# Patient Record
Sex: FEMALE | Race: WHITE | NOT HISPANIC OR LATINO | Employment: FULL TIME | URBAN - METROPOLITAN AREA
[De-identification: names, ages, dates, MRNs, and addresses within clinical notes are randomized per-mention and may not be internally consistent; named-entity substitution may affect disease eponyms.]

---

## 2019-05-17 PROBLEM — H60.313 CHRONIC DIFFUSE OTITIS EXTERNA OF BOTH EARS: Status: ACTIVE | Noted: 2018-06-25

## 2019-05-17 PROBLEM — J45.20 INTERMITTENT ASTHMA, UNCOMPLICATED: Status: ACTIVE | Noted: 2017-11-15

## 2019-05-17 PROBLEM — L29.9 PRURITUS: Status: ACTIVE | Noted: 2017-11-15

## 2019-05-17 PROBLEM — F41.9 ANXIETY: Status: ACTIVE | Noted: 2017-01-12

## 2019-05-17 PROBLEM — H66.93 RECURRENT OTITIS MEDIA OF BOTH EARS: Status: ACTIVE | Noted: 2017-12-18

## 2019-05-17 PROBLEM — Z87.01 HISTORY OF PNEUMONIA: Status: ACTIVE | Noted: 2018-01-05

## 2019-05-17 PROBLEM — R76.8 LOW SERUM IGM FOR AGE: Status: ACTIVE | Noted: 2017-12-18

## 2019-05-17 PROBLEM — L20.9 AD (ATOPIC DERMATITIS): Status: ACTIVE | Noted: 2017-11-15

## 2019-05-17 PROBLEM — J32.9 RECURRENT SINUS INFECTIONS: Status: ACTIVE | Noted: 2017-11-21

## 2019-05-17 PROBLEM — Z91.09 HOUSE DUST MITE ALLERGY: Status: ACTIVE | Noted: 2018-08-03

## 2019-05-17 PROBLEM — D72.19 PERIPHERAL EOSINOPHILIA: Status: ACTIVE | Noted: 2018-06-06

## 2019-05-17 PROBLEM — Z87.2 H/O ABSCESS OF SKIN AND SUBCUTANEOUS TISSUE: Status: ACTIVE | Noted: 2018-01-10

## 2019-05-17 PROBLEM — R76.8 ELEVATED IGE LEVEL: Status: ACTIVE | Noted: 2017-12-18

## 2019-10-21 PROBLEM — E66.01 CLASS 3 SEVERE OBESITY IN ADULT: Chronic | Status: ACTIVE | Noted: 2019-10-21

## 2019-10-21 PROBLEM — F33.1 MDD (MAJOR DEPRESSIVE DISORDER), RECURRENT EPISODE, MODERATE: Chronic | Status: ACTIVE | Noted: 2019-10-21

## 2019-11-05 PROBLEM — R03.0 ELEVATED BLOOD PRESSURE READING WITHOUT DIAGNOSIS OF HYPERTENSION: Status: ACTIVE | Noted: 2019-11-05

## 2019-12-02 PROBLEM — L29.9 PRURITUS: Status: RESOLVED | Noted: 2017-11-15 | Resolved: 2019-12-02

## 2020-02-14 PROBLEM — J30.9 ALLERGIC RHINOCONJUNCTIVITIS: Status: ACTIVE | Noted: 2020-02-14

## 2020-02-14 PROBLEM — H10.10 ALLERGIC RHINOCONJUNCTIVITIS: Status: ACTIVE | Noted: 2020-02-14

## 2020-02-14 PROBLEM — J30.89 ALLERGIC RHINITIS DUE TO DUST MITE: Status: ACTIVE | Noted: 2020-02-14

## 2020-05-14 PROBLEM — F41.9 ANXIETY: Status: RESOLVED | Noted: 2017-01-12 | Resolved: 2020-05-14

## 2020-06-12 PROBLEM — H62.41 OTITIS EXTERNA, FUNGAL, RIGHT EAR: Status: ACTIVE | Noted: 2020-06-12

## 2020-06-12 PROBLEM — B36.9 OTITIS EXTERNA, FUNGAL, RIGHT EAR: Status: ACTIVE | Noted: 2020-06-12

## 2020-06-12 PROBLEM — H60.543 ECZEMA OF EXTERNAL EAR, BILATERAL: Status: ACTIVE | Noted: 2020-06-12

## 2020-09-09 ENCOUNTER — TELEPHONE (OUTPATIENT)
Dept: PHARMACY | Facility: CLINIC | Age: 35
End: 2020-09-09

## 2020-09-23 NOTE — TELEPHONE ENCOUNTER
DOCUMENTATION ONLY:  Prior authorization for Dupixent approved from 09/19/20 to 09/22/21    Case Id: [id number]    Co-pay: $0

## 2020-10-25 DIAGNOSIS — U07.1 COVID-19 VIRUS DETECTED: ICD-10-CM

## 2021-01-12 ENCOUNTER — TELEPHONE (OUTPATIENT)
Dept: PHARMACY | Facility: CLINIC | Age: 36
End: 2021-01-12

## 2021-01-15 ENCOUNTER — SPECIALTY PHARMACY (OUTPATIENT)
Dept: PHARMACY | Facility: CLINIC | Age: 36
End: 2021-01-15

## 2021-01-15 DIAGNOSIS — L20.9 ATOPIC DERMATITIS, UNSPECIFIED TYPE: Primary | ICD-10-CM

## 2021-01-20 PROBLEM — F43.10 PTSD (POST-TRAUMATIC STRESS DISORDER): Status: ACTIVE | Noted: 2021-01-20

## 2021-02-08 ENCOUNTER — SPECIALTY PHARMACY (OUTPATIENT)
Dept: PHARMACY | Facility: CLINIC | Age: 36
End: 2021-02-08

## 2021-03-08 ENCOUNTER — SPECIALTY PHARMACY (OUTPATIENT)
Dept: PHARMACY | Facility: CLINIC | Age: 36
End: 2021-03-08

## 2021-04-01 ENCOUNTER — SPECIALTY PHARMACY (OUTPATIENT)
Dept: PHARMACY | Facility: CLINIC | Age: 36
End: 2021-04-01

## 2021-04-29 ENCOUNTER — PATIENT MESSAGE (OUTPATIENT)
Dept: PHARMACY | Facility: CLINIC | Age: 36
End: 2021-04-29

## 2021-05-05 ENCOUNTER — SPECIALTY PHARMACY (OUTPATIENT)
Dept: PHARMACY | Facility: CLINIC | Age: 36
End: 2021-05-05

## 2021-05-12 ENCOUNTER — PATIENT MESSAGE (OUTPATIENT)
Dept: RESEARCH | Facility: HOSPITAL | Age: 36
End: 2021-05-12

## 2021-06-04 ENCOUNTER — PATIENT MESSAGE (OUTPATIENT)
Dept: PHARMACY | Facility: CLINIC | Age: 36
End: 2021-06-04

## 2021-06-07 ENCOUNTER — SPECIALTY PHARMACY (OUTPATIENT)
Dept: PHARMACY | Facility: CLINIC | Age: 36
End: 2021-06-07

## 2021-06-30 ENCOUNTER — PATIENT MESSAGE (OUTPATIENT)
Dept: PHARMACY | Facility: CLINIC | Age: 36
End: 2021-06-30

## 2021-07-05 PROBLEM — F43.10 PTSD (POST-TRAUMATIC STRESS DISORDER): Chronic | Status: ACTIVE | Noted: 2021-01-20

## 2021-07-05 PROBLEM — H60.313 CHRONIC DIFFUSE OTITIS EXTERNA OF BOTH EARS: Chronic | Status: ACTIVE | Noted: 2018-06-25

## 2021-07-05 PROBLEM — J30.89 ALLERGIC RHINITIS DUE TO DUST MITE: Chronic | Status: ACTIVE | Noted: 2020-02-14

## 2021-07-05 PROBLEM — H66.93 RECURRENT OTITIS MEDIA OF BOTH EARS: Chronic | Status: ACTIVE | Noted: 2017-12-18

## 2021-07-05 PROBLEM — J32.9 RECURRENT SINUS INFECTIONS: Chronic | Status: ACTIVE | Noted: 2017-11-21

## 2021-07-05 PROBLEM — Z87.01 HISTORY OF PNEUMONIA: Status: RESOLVED | Noted: 2018-01-05 | Resolved: 2021-07-05

## 2021-07-05 PROBLEM — J45.20 MILD INTERMITTENT ASTHMA WITHOUT COMPLICATION: Chronic | Status: ACTIVE | Noted: 2017-11-15

## 2021-07-05 PROBLEM — H60.543 ECZEMA OF EXTERNAL EAR, BILATERAL: Chronic | Status: ACTIVE | Noted: 2020-06-12

## 2021-07-05 PROBLEM — F41.9 ANXIETY DISORDER, UNSPECIFIED: Chronic | Status: ACTIVE | Noted: 2017-01-12

## 2021-07-05 PROBLEM — R03.0 ELEVATED BLOOD PRESSURE READING WITHOUT DIAGNOSIS OF HYPERTENSION: Status: RESOLVED | Noted: 2019-11-05 | Resolved: 2021-07-05

## 2021-07-05 PROBLEM — Z91.09 HOUSE DUST MITE ALLERGY: Chronic | Status: ACTIVE | Noted: 2018-08-03

## 2021-07-06 ENCOUNTER — SPECIALTY PHARMACY (OUTPATIENT)
Dept: PHARMACY | Facility: CLINIC | Age: 36
End: 2021-07-06

## 2021-07-08 PROBLEM — I10 ESSENTIAL HYPERTENSION: Chronic | Status: ACTIVE | Noted: 2021-07-08

## 2021-07-08 PROBLEM — F43.23 ADJUSTMENT DISORDER WITH MIXED ANXIETY AND DEPRESSED MOOD: Chronic | Status: ACTIVE | Noted: 2021-07-08

## 2021-07-30 ENCOUNTER — SPECIALTY PHARMACY (OUTPATIENT)
Dept: PHARMACY | Facility: CLINIC | Age: 36
End: 2021-07-30

## 2021-09-05 ENCOUNTER — SPECIALTY PHARMACY (OUTPATIENT)
Dept: PHARMACY | Facility: CLINIC | Age: 36
End: 2021-09-05

## 2021-09-28 ENCOUNTER — SPECIALTY PHARMACY (OUTPATIENT)
Dept: PHARMACY | Facility: CLINIC | Age: 36
End: 2021-09-28

## 2021-09-29 ENCOUNTER — SPECIALTY PHARMACY (OUTPATIENT)
Dept: PHARMACY | Facility: CLINIC | Age: 36
End: 2021-09-29

## 2021-10-22 ENCOUNTER — SPECIALTY PHARMACY (OUTPATIENT)
Dept: PHARMACY | Facility: CLINIC | Age: 36
End: 2021-10-22

## 2021-11-26 ENCOUNTER — SPECIALTY PHARMACY (OUTPATIENT)
Dept: PHARMACY | Facility: CLINIC | Age: 36
End: 2021-11-26

## 2021-12-28 ENCOUNTER — PATIENT MESSAGE (OUTPATIENT)
Dept: PHARMACY | Facility: CLINIC | Age: 36
End: 2021-12-28

## 2022-01-04 ENCOUNTER — SPECIALTY PHARMACY (OUTPATIENT)
Dept: PHARMACY | Facility: CLINIC | Age: 37
End: 2022-01-04

## 2022-01-05 NOTE — TELEPHONE ENCOUNTER
Incoming call from pt regarding dupixent refill, pt due to inject 1/7. Informed pt refill request has been sent and will f/u to set up shipment once received, pt verbalized understanding. Pt also sending request to MD through [employer].

## 2022-01-12 NOTE — TELEPHONE ENCOUNTER
Called [facility] for Dr. Guy regarding Dupixent refill (P: [phone]) and was informed Dr. Guy is no longer there. Office advised to call back after 1pm today to reach nurse's station regarding refill.

## 2022-01-12 NOTE — TELEPHONE ENCOUNTER
Called Allergy for Dr. Guy regarding Dupixent refill (P: [phone]) - [facility]. [facility] will attempt again, office likely closed.

## 2022-01-14 NOTE — TELEPHONE ENCOUNTER
Called Allergy for Dr. Guy regarding Dupixent refill (P: [phone]) - [facility].     Called patient regarding Dupixent refill to inform [facility] is still trying to reach provider for refills, and no refills remaining on file. Patient stated she was due to inject Friday 1/7. [facility] will follow up with patient as soon as refills can be obtained.

## 2022-01-18 NOTE — TELEPHONE ENCOUNTER
Called Allergy for Dr. Guy regarding Dupixent refill (P: [phone]). Staff informed clinic is 1-2pm and that is the best time to call back. Staff attempted to transfer to fellows office - LVM with fellows office to request refill.

## 2022-01-20 NOTE — TELEPHONE ENCOUNTER
Called Allergy for Dr. Guy to request Dupixent refill (P: [phone]). Staff transferred call to nurse Corinna. Corinna stated that Dr. Guy is no longer there and patient would have to see new provider. She stated office would reach out to patient regarding Allergy provider and would send Dupixent refill once authorized. She said patient may need to be seen first. Corinna said she would call patient today.

## 2022-02-02 NOTE — TELEPHONE ENCOUNTER
Called patient regarding Dupixent to review if she has been able to establish care with new provider. She stated she was told she would need another appt and is reluctant. Advised patient to call clinic and see if she is able to get refills prior to being seen since this is a continuation. Informed [facility] will touch base when a new order is sent. She voiced understanding and had no further questions or concerns.     Closing patient enrollment with [facility] and will reopen if patient has new order sent.

## 2022-03-08 PROBLEM — H62.41 OTITIS EXTERNA, FUNGAL, RIGHT EAR: Status: RESOLVED | Noted: 2020-06-12 | Resolved: 2022-03-08

## 2022-03-08 PROBLEM — R76.8 LOW SERUM IGM FOR AGE: Status: RESOLVED | Noted: 2017-12-18 | Resolved: 2022-03-08

## 2022-03-08 PROBLEM — H10.10 ALLERGIC RHINOCONJUNCTIVITIS: Status: RESOLVED | Noted: 2020-02-14 | Resolved: 2022-03-08

## 2022-03-08 PROBLEM — J30.9 ALLERGIC RHINOCONJUNCTIVITIS: Status: RESOLVED | Noted: 2020-02-14 | Resolved: 2022-03-08

## 2022-03-08 PROBLEM — D72.19 PERIPHERAL EOSINOPHILIA: Status: RESOLVED | Noted: 2018-06-06 | Resolved: 2022-03-08

## 2022-03-08 PROBLEM — L20.9 AD (ATOPIC DERMATITIS): Status: RESOLVED | Noted: 2017-11-15 | Resolved: 2022-03-08

## 2022-03-08 PROBLEM — Z87.2 H/O ABSCESS OF SKIN AND SUBCUTANEOUS TISSUE: Status: RESOLVED | Noted: 2018-01-10 | Resolved: 2022-03-08

## 2022-03-08 PROBLEM — B36.9 OTITIS EXTERNA, FUNGAL, RIGHT EAR: Status: RESOLVED | Noted: 2020-06-12 | Resolved: 2022-03-08

## 2022-03-08 PROBLEM — R76.8 ELEVATED IGE LEVEL: Status: RESOLVED | Noted: 2017-12-18 | Resolved: 2022-03-08

## 2022-03-13 PROBLEM — M25.561 CHRONIC PAIN OF RIGHT KNEE: Chronic | Status: ACTIVE | Noted: 2022-03-13

## 2022-03-13 PROBLEM — G89.29 CHRONIC PAIN OF RIGHT KNEE: Chronic | Status: ACTIVE | Noted: 2022-03-13

## 2022-03-13 PROBLEM — G56.03 BILATERAL CARPAL TUNNEL SYNDROME: Chronic | Status: ACTIVE | Noted: 2022-03-13

## 2022-09-20 ENCOUNTER — SPECIALTY PHARMACY (OUTPATIENT)
Dept: PHARMACY | Facility: CLINIC | Age: 37
End: 2022-09-20

## 2022-09-20 NOTE — TELEPHONE ENCOUNTER
Avinash, this is Amita Olivarez with [facility].  We are working on your prescription that your doctor has sent us. We will be working with your insurance to get this approved for you. We will be calling you along the way with updates on your medication.  If you have any questions, you can reach us at [phone].    Welcome call outcome: Patient/caregiver reached

## 2022-09-20 NOTE — TELEPHONE ENCOUNTER
Faxed PA to [facility], will follow up until a determination is made.

## 2022-09-22 NOTE — TELEPHONE ENCOUNTER
Faxed [phone] for a redetermination, prescription was in consultation with a dermatologist. Will follow up.

## 2022-09-23 NOTE — TELEPHONE ENCOUNTER
PA tracking ID [id number] approved 09/22/22 to 03/22/23, Dupixent 300/2ml.  Test claim pays for 0.00 for loading and maintenance.    BI/FA not needed, sending patient to initial.

## 2022-09-26 ENCOUNTER — SPECIALTY PHARMACY (OUTPATIENT)
Dept: PHARMACY | Facility: CLINIC | Age: 37
End: 2022-09-26

## 2022-09-26 DIAGNOSIS — L20.9 ATOPIC DERMATITIS, UNSPECIFIED TYPE: Primary | ICD-10-CM

## 2022-09-26 NOTE — TELEPHONE ENCOUNTER
Specialty Pharmacy - Initial Clinical Assessment    Specialty Medication Orders Linked to Encounter      Flowsheet Row Most Recent Value   Medication #1 dupilumab (DUPIXENT SYRINGE) 300 mg/2 mL Syrg (Order#[id number], Rx#[id number])   Medication #2 dupilumab (DUPIXENT SYRINGE) 300 mg/2 mL Syrg (Order#[id number], Rx#[id number])          Patient Diagnosis   L20.9 - Atopic dermatitis, unspecified type    Subjective    Maira Quintana is a 37 y.o. female, who is followed by the specialty pharmacy service for management and education.    Recent Encounters       Date Type Provider Description    09/26/2022 Specialty Pharmacy Amita Olivarez PharmD Initial Clinical Assessment    09/20/2022 Specialty Pharmacy Amita Olivarez PharmD Referral Authorization    01/04/2022 Specialty Pharmacy Maira Reeves PharmD Refill Coordination; Clinical Intervention    11/26/2021 Specialty Pharmacy Dione Padua Esguerra Refill Coordination    10/22/2021 Specialty Pharmacy Estella Almendarez Refill Coordination          Clinical call attempts since last clinical assessment   1/14/2022  3:22 PM - Specialty Pharmacy - Clinical Intervention by Maira Reeves PharmD  1/20/2022  7:29 PM - Specialty Pharmacy - Clinical Intervention by aMira Reeves PharmD  1/24/2022  8:36 PM - Specialty Pharmacy - Clinical Intervention by Maira Reeves PharmD     Current Outpatient Medications   Medication Sig    albuterol (VENTOLIN HFA) 90 mcg/actuation inhaler Inhale 2 puffs into the lungs every 4 (four) hours as needed for Wheezing or Shortness of Breath (or cough related to asthma).    ALPRAZolam (XANAX) 0.5 MG tablet Take 1 tablet (0.5 mg total) by mouth nightly as needed for Anxiety.    dupilumab (DUPIXENT SYRINGE) 300 mg/2 mL Syrg Inject 4 mLs (600 mg total) into the skin once. for 1 dose    [START ON 9/29/2022] dupilumab (DUPIXENT SYRINGE) 300 mg/2 mL Syrg Inject 2 mLs (300 mg total) into the skin every 14 (fourteen) days.    fluticasone  propionate (FLONASE) 50 mcg/actuation nasal spray 1 spray (50 mcg total) by Each Nostril route once daily.    traZODone (DESYREL) 100 MG tablet Take 1 tablet (100 mg total) by mouth nightly as needed for Insomnia.    tretinoin (RETIN-A) 0.025 % cream Apply topically every evening.    triamcinolone acetonide 0.1% (KENALOG) 0.1 % ointment Apply to eczema flares on arms, legs, and trunk twice daily as needed.    venlafaxine (EFFEXOR-XR) 150 MG Cp24 Take 1 capsule (150 mg total) by mouth every morning.    etonogestreL (NEXPLANON) 68 mg Impl subdermal device 68 mg by Subdermal route once. A single NEXPLANON implant is inserted subdermally just under the skin at the inner side of the non-dominant upper arm.    neomycin-polymyxin-dexamethasone (MAXITROL) 3.5mg/mL-10,000 unit/mL-0.1 % DrpS     omeprazole (PRILOSEC) 40 MG capsule     ondansetron (ZOFRAN-ODT) 4 MG TbDL Take 4 mg by mouth every 6 (six) hours as needed.   Last reviewed on 9/26/2022  2:03 PM by Amita Olivarez, PharmD    Review of patient's allergies indicates:  No Known AllergiesLast reviewed on  9/26/2022 2:03 PM by Amita Olivarez    Drug Interactions    Drug interactions evaluated: yes  Clinically relevant drug interactions identified: no  Provided the patient with educational material regarding drug interactions: not applicable         Adverse Effects    Pruritus: Pos       Assessment Questions - Documented Responses      Flowsheet Row Most Recent Value   Assessment    Medication Reconciliation completed for patient Yes   During the past 4 weeks, has patient missed any activities due to condition or medication? Missed Work, Missed Planned Activities   During the past 4 weeks, did patient have any of the following urgent care visits? None   Goals of Therapy Status Discussed (new start)   Status of the patients ability to self-administer: Is Able   All education points have been covered with patient? No, patient declined- printed education provided   Welcome packet  "contents reviewed and discussed with patient? Yes   Assesment completed? Yes   Plan Therapy being initiated   Do you need to open a clinical intervention (i-vent)? No   Do you want to schedule first shipment? Yes          Refill Questions - Documented Responses      Flowsheet Row Most Recent Value   Patient Availability and HIPAA Verification    Does patient want to proceed with activity? Yes   HIPAA/medical authority confirmed? Yes   Relationship to patient of person spoken to? Self   Refill Screening Questions    When does the patient need to receive the medication? 09/28/22   Refill Delivery Questions    How will the patient receive the medication? Mail   When does the patient need to receive the medication? 09/28/22   Shipping Address Home   Address in [facility] confirmed and updated if neccessary? Yes   Expected Copay ($) 0   Is the patient able to afford the medication copay? Yes   Payment Method zero copay   Days supply of Refill 14   Supplies needed? No supplies needed   Refill activity completed? Yes   Refill activity plan Refill scheduled   Shipment/Pickup Date: 09/27/22            Objective    She has a past medical history of Abnormal Pap smear of cervix, Abscess of skin of abdomen (2016), Admitted with pneumonia (1992), Allergy, Anxiety (01/12/2017), Asthma, Depression, Eczema (1995), H/O gastric sleeve, Hypertension, and PTSD (post-traumatic stress disorder).    Tried/failed medications: eucrisa/ elidel/ topical steroids. Patient previously on Dupixent therapy, patient restarting Dupixent.    BP Readings from Last 4 Encounters:   09/15/22 136/79   08/02/22 117/67   03/31/22 128/80   03/18/22 (!) 150/70     Ht Readings from Last 4 Encounters:   09/15/22 5' 2" (1.575 m)   08/02/22 5' 2" (1.575 m)   03/31/22 5' 2" (1.575 m)   03/18/22 5' 2" (1.575 m)     Wt Readings from Last 4 Encounters:   09/15/22 83.9 kg (185 lb)   08/02/22 101.2 kg (223 lb)   03/31/22 101.2 kg (223 lb)   03/18/22 101.3 kg " (223 lb 6.4 oz)     No results for input(s): CREATININE, ALT, AST, HEPBSAG, HEPBSAB, HEPBCAB in the last 2160 hours.  The goals of prescribed drug therapy management include:  Supporting patient to meet the prescriber's medical treatment objectives  Improving or maintaining quality of life  Maintaining optimal therapy adherence  Minimizing and managing side effects      Goals of Therapy Status: Discussed (new start)    Assessment/Plan  Patient plans to start therapy on 09/28/22      Indication, dosage, appropriateness, effectiveness, safety and convenience of her specialty medication(s) were reviewed today.     Patient Education   Pharmacist offer to  patient was declined. Printed educational materials will be provided with medication.  Patient did accept verbal education on the following topics:  · Side effects, including prevention, minimization, and management  · Contraindications and safety precautions  · New or changed medications, including prescribe and over the counter medications and supplements  · Reviews recommended vaccinations, as appropriate  · Storage, safe handling, and disposal    Patient reports flaring on chest, knee, feet, and arms, she also reports missed planned activities. Patient is restarting Dupixent, declined injection training. Patient utilized [facility] and familiar to services. Patient had no additional questions.    Tasks added this encounter   No tasks added.   Tasks due within next 3 months   9/23/2022 - Clinical - Initial Assessment     Amita Olivarez, PharmD  [facility]  [address] [place] [id number]  Phone: [phone]  Fax: [phone]

## 2022-10-10 ENCOUNTER — SPECIALTY PHARMACY (OUTPATIENT)
Dept: PHARMACY | Facility: CLINIC | Age: 37
End: 2022-10-10

## 2022-10-10 NOTE — TELEPHONE ENCOUNTER
Outgoing call regarding dupixent refill, pt reports next injection date 10/19, insurance rejecting for PA, routing to Amita

## 2022-10-12 ENCOUNTER — PATIENT MESSAGE (OUTPATIENT)
Dept: PHARMACY | Facility: CLINIC | Age: 37
End: 2022-10-12

## 2022-10-12 NOTE — TELEPHONE ENCOUNTER
KYA Mena in triage assessing pt.    Outgoing call to patient, informed patient to call [facility] at [phone] to order Dupixent. Patient repeated understanding. Sent patient [employer] message with copay card link, closing enrollment at [facility].

## 2022-10-12 NOTE — TELEPHONE ENCOUNTER
PA approved from [employer] 10/12/2022 to 04/12/2023 loaded to [employer].  Patient is mandated to fill with [facility].    Sent staff message to [facility] to request new script sent to [facility].  Will follow up. Will notify patient later this afternoon.

## 2023-03-22 ENCOUNTER — SPECIALTY PHARMACY (OUTPATIENT)
Dept: PHARMACY | Facility: CLINIC | Age: 38
End: 2023-03-22

## 2023-03-22 NOTE — TELEPHONE ENCOUNTER
Dupixent refill  Hello, this is Art Jarvis with [facility].  We are working on your prescription that your doctor has sent us. We will be working with your insurance to get this approved for you. We will be calling you along the way with updates on your medication.  If you have any questions, you can reach us at [phone].    Welcome call outcome: Patient/caregiver reached    Patient has been filling at  [facility]. Was awaiting refills to be sent in after follow up appointment completed on 3/22/23. Patient is past due for dose. Will forward RX to [facility] for patient to resume therapy. Current PA expires 04/12/23. Opening order set to work on reauthorization for patient. Patient has no further questions at this time

## 2023-03-24 ENCOUNTER — TELEPHONE (OUTPATIENT)
Dept: ADMINISTRATIVE | Facility: HOSPITAL | Age: 38
End: 2023-03-24

## 2023-03-24 VITALS — DIASTOLIC BLOOD PRESSURE: 75 MMHG | SYSTOLIC BLOOD PRESSURE: 129 MMHG

## 2023-03-24 NOTE — TELEPHONE ENCOUNTER
PA approved from 03/24/2023 to 09/24/2023  Case ID: [id number]     Patient made aware of PA approval. States she has contacted [facility] to refill prescription. Closing current [facility] enrollment